# Patient Record
Sex: FEMALE | Race: OTHER | NOT HISPANIC OR LATINO | URBAN - METROPOLITAN AREA
[De-identification: names, ages, dates, MRNs, and addresses within clinical notes are randomized per-mention and may not be internally consistent; named-entity substitution may affect disease eponyms.]

---

## 2021-07-21 ENCOUNTER — EMERGENCY (EMERGENCY)
Facility: HOSPITAL | Age: 21
LOS: 0 days | Discharge: HOME | End: 2021-07-21
Attending: EMERGENCY MEDICINE | Admitting: EMERGENCY MEDICINE
Payer: SELF-PAY

## 2021-07-21 VITALS
OXYGEN SATURATION: 100 % | RESPIRATION RATE: 17 BRPM | SYSTOLIC BLOOD PRESSURE: 132 MMHG | HEART RATE: 67 BPM | DIASTOLIC BLOOD PRESSURE: 60 MMHG | TEMPERATURE: 98 F

## 2021-07-21 DIAGNOSIS — K02.9 DENTAL CARIES, UNSPECIFIED: ICD-10-CM

## 2021-07-21 DIAGNOSIS — K08.89 OTHER SPECIFIED DISORDERS OF TEETH AND SUPPORTING STRUCTURES: ICD-10-CM

## 2021-07-21 DIAGNOSIS — K03.81 CRACKED TOOTH: ICD-10-CM

## 2021-07-21 PROCEDURE — 99282 EMERGENCY DEPT VISIT SF MDM: CPT

## 2021-07-21 NOTE — ED PROVIDER NOTE - OBJECTIVE STATEMENT
left lower dental pain x 2 days. Chipped the tooth 1 yrs ago. No relief with Motrin. No fever, chill or SOB

## 2021-07-21 NOTE — ED PROVIDER NOTE - NS ED ROS FT
CONST: No fever, chills or bodyaches  EYES: No pain, redness, drainage or visual changes.  ENT: No ear pain or discharge, nasal discharge or congestion. No sore throat  SKIN: No rashes  NEURO: No headache, dizziness, paresthesias or LOC

## 2021-07-21 NOTE — ED PROVIDER NOTE - ATTENDING CONTRIBUTION TO CARE
19 yo female with toothache for a few days, the offending tooth chipped over a year ago,  No difficulties swallowing or breathing, no fever.  Well-appearing, NAD, exam as noted,  Transfer to dental clinic for further evaluation and treatment.

## 2021-07-21 NOTE — CONSULT NOTE ADULT - ASSESSMENT
Patient is a 20y old  Female who presents with a chief complaint of tooth pain on the lower left.     HPI: pain that started 2 days ago,       PAST MEDICAL & SURGICAL HISTORY: Otherwise healthy taking no medication     ( -  ) heart valve replacement  ( -  ) joint replacement  ( -  ) pregnancy    MEDICATIONS  (STANDING): NONE    MEDICATIONS  (PRN):NONE      Allergies    No Known Allergies    Intolerances              *SOCIAL HISTORY: ( -  ) Tobacco; (-   ) ETOH    *Last Dental Visit: patient last visit don't remember when.    Vital Signs Last 24 Hrs  T(C): 36.7 (21 Jul 2021 14:11), Max: 36.7 (21 Jul 2021 14:11)  T(F): 98.1 (21 Jul 2021 14:11), Max: 98.1 (21 Jul 2021 14:11)  HR: 67 (21 Jul 2021 14:11) (67 - 67)  BP: 132/60 (21 Jul 2021 14:11) (132/60 - 132/60)  BP(mean): --  RR: 17 (21 Jul 2021 14:11) (17 - 17)  SpO2: 100% (21 Jul 2021 14:11) (100% - 100%)    LABS:                  EOE:  TMJ ( --  ) clicks                     (  - ) pops                     (  - ) crepitus             Mandible <<FROM>>             Facial bones and MOM <<grossly intact>>             (  - ) trismus             ( -  ) lymphadenopathy             ( -  ) swelling             ( -  ) asymmetry             ( -  ) palpation             ( -  ) dyspnea             (  - ) dysphagia             ( -  ) loss of consciousness    IOE:  <<permanent/primary/mixed>> dentition: <<grossly intact>> OR <<multiple carious teeth>> OR <<multiple missing teeth>>           hard/soft palate:  (   ) palatal torus, <<No pathology noted>>           tongue/FOM <<No pathology noted>>           labial/buccal mucosa <<No pathology noted>>           (  + ) percussion #18            (  + ) palpation #18            (  -) swelling            (  - ) abscess           (  - ) sinus tract        Caries: Deep caries in to the pulp         *DENTAL RADIOGRAPHS: One periapical Xray     RADIOLOGY & ADDITIONAL STUDIES:    *ASSESSMENT: # 18 deep caries to the pulp, tooth non restorable.       *PLAN: extraction of tooth #18     PROCEDURE:   Verbal and written consent given.  Anesthesia: 1 carpule of 2% lidocaine, 1:100 K epinephrin IANB    Treatment: Extraction #18 , 500 mg amoxicillin 3 times a day, 600 ibuprofen as needed     RECOMMENDATIONS:  1)   2) Dental F/U with outpatient dentist for comprehensive dental care.   3) If any difficulty swallowing/breathing, fever occur, return to ER.     Resident Name, pager #

## 2021-07-21 NOTE — ED PROVIDER NOTE - PHYSICAL EXAMINATION
CONST: Well appearing in NAD  EYES: PERRL, EOMI, Sclera and conjunctiva clear.   ENT: (+) #18 is broken and TTP with localized gingival swelling. Oropharynx normal appearing, no erythema or exudates. Uvula midline.  NECK: Non-tender, no meningeal signs  SKIN: Warm, dry, no acute rashes. Good turgor  NEURO: A&Ox3, No focal deficits. Strength 5/5 with no sensory deficits. Steady gait

## 2021-08-09 ENCOUNTER — EMERGENCY (EMERGENCY)
Facility: HOSPITAL | Age: 21
LOS: 0 days | Discharge: HOME | End: 2021-08-09
Attending: EMERGENCY MEDICINE | Admitting: STUDENT IN AN ORGANIZED HEALTH CARE EDUCATION/TRAINING PROGRAM
Payer: SELF-PAY

## 2021-08-09 VITALS
HEART RATE: 58 BPM | OXYGEN SATURATION: 99 % | TEMPERATURE: 98 F | DIASTOLIC BLOOD PRESSURE: 56 MMHG | RESPIRATION RATE: 18 BRPM | SYSTOLIC BLOOD PRESSURE: 115 MMHG

## 2021-08-09 DIAGNOSIS — K08.89 OTHER SPECIFIED DISORDERS OF TEETH AND SUPPORTING STRUCTURES: ICD-10-CM

## 2021-08-09 DIAGNOSIS — K02.9 DENTAL CARIES, UNSPECIFIED: ICD-10-CM

## 2021-08-09 DIAGNOSIS — K03.81 CRACKED TOOTH: ICD-10-CM

## 2021-08-09 PROCEDURE — 99282 EMERGENCY DEPT VISIT SF MDM: CPT

## 2021-08-09 NOTE — ED PROVIDER NOTE - NS ED ROS FT
Consitutional: No fever  ENMT:  No neck pain +dental pain  Cardiac:  No chest pain  Respiratory:  No cough  GI:  No abdominal pain, nausea, vomiting, or diarrhea  Skin:  No skin rash

## 2021-08-09 NOTE — ED PROVIDER NOTE - CLINICAL SUMMARY MEDICAL DECISION MAKING FREE TEXT BOX
21 yo F with no PMH, here with 1 week of right lower tooth pain. Pt took tylenol with relief in pain now. Pt chipped her tooth 1 year ago on the right, and the same thing happened to her left side. She had the left lower tooth extracted by dental. No fever, no CP, no SOB. No facial swelling. Pt could not get an appt with a dentist soon so came to the ED. Exam - Gen - NAD, Head - NCAT, Pharynx - clear, MMM, Mouth - tooth # 31 - chipped/decay at the medial aspect, tooth 30 with visible cavity, no surrounding gingival inflammation, erythema, edema, Heart - RRR, no m/g/r, Lungs - CTAB, no, Skin - No rash, Extremities - FROM, no edema, erythema, ecchymosis. Dx - dental pain. Plan - transfer to dental clinic

## 2021-08-09 NOTE — CONSULT NOTE ADULT - SUBJECTIVE AND OBJECTIVE BOX
Patient is a 20y old  Female who presents with a chief complaint of tooth pain on the lower right side    HPI: Patient reports pain on tooth #31 starting 1 year ago    PAST MEDICAL & SURGICAL HISTORY: No significant medical history    Allergies    No Known Allergies    Intolerances    FAMILY HISTORY:    *SOCIAL HISTORY: ( - ) Tobacco; ( - ) ETOH    *Last Dental Visit: 2 weeks ago on an emergency basis    Vital Signs Last 24 Hrs  T(C): 36.5 (09 Aug 2021 10:48), Max: 36.5 (09 Aug 2021 10:48)  T(F): 97.7 (09 Aug 2021 10:48), Max: 97.7 (09 Aug 2021 10:48)  HR: 58 (09 Aug 2021 10:48) (58 - 58)  BP: 115/56 (09 Aug 2021 10:48) (115/56 - 115/56)  BP(mean): --  RR: 18 (09 Aug 2021 10:48) (18 - 18)  SpO2: 99% (09 Aug 2021 10:48) (99% - 99%)    EOE:  TMJ ( -  ) clicks                     ( -  ) pops                     (  - ) crepitus             Mandible <<FROM>>             Facial bones and MOM <<grossly intact>>             ( -  ) trismus             ( -  ) lymphadenopathy             ( -  ) swelling             (  - ) asymmetry             (  - ) palpation             (  - ) dyspnea             (  - ) dysphagia             (  - ) loss of consciousness    IOE:  <<permanent>> dentition: <<multiple carious teeth>>            hard/soft palate:  (  - ) palatal torus, <<No pathology noted>>           tongue/FOM <<No pathology noted>>           labial/buccal mucosa <<No pathology noted>>           ( + ) percussion           ( - ) palpation           ( - ) swelling            ( - ) abscess           ( - ) sinus tract    *DENTAL RADIOGRAPHS: 1 Periapical of tooth #31    RADIOLOGY & ADDITIONAL STUDIES:    *ASSESSMENT: Non-restorable crown fracture tooth #31 with dental caries into pulp.    *PLAN: Emergency exam performed. 1 Periapical radiograph taken of tooth #31. Treatment consequences explained to patient as per OS sheet dated 7/13/00. Consent obtained. Side site completed. Patient anesthetized with 1 carpule of 2% Lidocaine with 1:100,000 Epinephrine via inferior alveolar nerve block on the lower right side, followed by 1 carpule of 4% Septocaine with 1:200,000 Epinephrine via buccal and lingual infiltrations around tooth #31. Tooth #31 elevated, and patient reported pain in the area. 1 carpule of 4% Septocaine with 1:200,000 Epinephrine given via intra-ligamental injections around tooth #31. Surgical flap created to access tooth #31 root structure. Tooth #31 sectioned into 2 pieces, and buccal bone removed. Tooth #31 extracted in 2 seperate pieces. Socket irrigated with saline and curettage performed. Post-op radiograph taken. 2 pieces of Surgicel gelfoam placed into extraction socket, and 1 continuous 3.0-suture placed to stabilize surgical flap. Hemostasis achieved. Post-op instructions given to aptient verbally and written. Prescriptions given to patient for amoxicillin 500mg and tylenol extra strength 500mg. Patient dismissed.      RECOMMENDATIONS:  1) Dental F/U with outpatient dentist for comprehensive dental care.   2) If any difficulty swallowing/breathing, fever occur, return to ER.     Александр Bills, DMD #[id number]

## 2021-08-09 NOTE — ED PROVIDER NOTE - PHYSICAL EXAMINATION
CONSTITUTIONAL: in no acute distress  SKIN: warm, dry  EYES: no conjunctival erythema  ENT: No nasal discharge; airway clear, no oropharyngeal discharge or rash, tooth 18 s/p extraction, tooth 31 half of it medially decayed off with cavity and ttp, tooth 30 with cavity and ttp  CARD: Regular rate and rhythm  RESP: No wheezes, rales or rhonchi  NEURO: Alert, oriented, grossly unremarkable  PSYCH: Cooperative, appropriate

## 2021-08-09 NOTE — ED PROVIDER NOTE - OBJECTIVE STATEMENT
20yoF w/o pmhx who present with 1 week of worsening right lower tooth pain. she chipped the tooth 1 year ago but has been fine until this week. she took tylenol 2 hr pta and currently pain is resolved. no fever vomiting difficulty swallowing sob oral discharge.

## 2021-08-09 NOTE — ED PROVIDER NOTE - ATTENDING CONTRIBUTION TO CARE
19 yo F with no PMH, here with 1 week of right lower tooth pain. Pt took tylenol with relief in pain now. Pt chipped her tooth 1 year ago on the right, and the same thing happened to her left side. She had the left lower tooth extracted by dental. No fever, no CP, no SOB. No facial swelling. Pt could not get an appt with a dentist soon so came to the ED. Exam - Gen - NAD, Head - NCAT, Pharynx - clear, MMM, Mouth - tooth # 31 - chipped/decay at the medial aspect, tooth 30 with visible cavity, no surrounding gingival inflammation, erythema, edema, Heart - RRR, no m/g/r, Lungs - CTAB, no, Skin - No rash, Extremities - FROM, no edema, erythema, ecchymosis. Dx - dental pain. Plan - transfer to dental clinic